# Patient Record
Sex: MALE
[De-identification: names, ages, dates, MRNs, and addresses within clinical notes are randomized per-mention and may not be internally consistent; named-entity substitution may affect disease eponyms.]

---

## 2018-10-09 ENCOUNTER — HOSPITAL ENCOUNTER (EMERGENCY)
Dept: HOSPITAL 97 - ER | Age: 48
Discharge: HOME | End: 2018-10-09
Payer: COMMERCIAL

## 2018-10-09 DIAGNOSIS — L98.0: Primary | ICD-10-CM

## 2018-10-09 DIAGNOSIS — I10: ICD-10-CM

## 2018-10-09 PROCEDURE — 99282 EMERGENCY DEPT VISIT SF MDM: CPT

## 2018-10-09 NOTE — EDPHYS
Physician Documentation                                                                           

 [facility]                                                                

Name: Lucas Riddle                                                                               

Age: 47 yrs                                                                                       

Sex: Male                                                                                         

: 1970                                                                                   

MRN: [id number]                                                                                   

Arrival Date: 10/09/2018                                                                          

Time: 19:20                                                                                       

Account#: [id number]                                                                            

Bed 11                                                                                            

Private MD:                                                                                       

ED Physician Rory Nye                                                                         

HPI:                                                                                              

10/09                                                                                             

20:00 This 47 yrs old  Male presents to ER via Ambulatory with complaints of          snw 

      Laceration - finger.                                                                        

20:00 Onset: The symptoms/episode began/occurred gradually, 4 week(s) ago, and became         snw 

      persistent. The patient has not experienced similar symptoms in the past. The patient       

      has not recently seen a physician. area bleeds easily and annoys patient.                   

                                                                                                  

Historical:                                                                                       

- Allergies:                                                                                      

19:31 No Known Allergies;                                                                     sr5 

- Home Meds:                                                                                      

19:31 antibiotic? [Active];                                                                   sr5 

- PMHx:                                                                                           

19:31 borderline HTN;                                                                         sr5 

                                                                                                  

- Immunization history:: Last tetanus immunization: up to date.                                   

- Social history:: Smoking status: Patient/guardian denies using tobacco, never smoked.           

- Ebola Screening: : Patient negative for fever greater than or equal to 101.5 degrees            

  Fahrenheit, and additional compatible Ebola Virus Disease symptoms.                             

                                                                                                  

                                                                                                  

ROS:                                                                                              

19:59 Constitutional: Negative for fever, chills, and weight loss, Eyes: Negative for injury, snw 

      pain, redness, and discharge, ENT: Negative for injury, pain, and discharge, Neck:          

      Negative for injury, pain, and swelling, Cardiovascular: Negative for chest pain,           

      palpitations, and edema, Respiratory: Negative for shortness of breath, cough,              

      wheezing, and pleuritic chest pain, Abdomen/GI: Negative for abdominal pain, nausea,        

      vomiting, diarrhea, and constipation, Back: Negative for injury and pain, : Negative      

      for injury, bleeding, discharge, and swelling, Skin: Negative for injury, rash, and         

      discoloration, Neuro: Negative for headache, weakness, numbness, tingling, and seizure.     

19:59 MS/extremity: Positive for injury or acute deformity, laceration, swelling, of the          

      palmar aspect of proximal phalanx of right ring finger.                                     

                                                                                                  

Exam:                                                                                             

19:58 Constitutional:  This is a well developed, well nourished patient who is awake, alert,  snw 

      and in no acute distress. Head/Face:  Normocephalic, atraumatic. Eyes:  Pupils equal        

      round and reactive to light, extra-ocular motions intact.  Lids and lashes normal.          

      Conjunctiva and sclera are non-icteric and not injected.  Cornea within normal limits.      

      Periorbital areas with no swelling, redness, or edema. ENT:  Nares patent. No nasal         

      discharge, no septal abnormalities noted.  Tympanic membranes are normal and external       

      auditory canals are clear.  Oropharynx with no redness, swelling, or masses, exudates,      

      or evidence of obstruction, uvula midline.  Mucous membranes moist. Neck:  Trachea          

      midline, no thyromegaly or masses palpated, and no cervical lymphadenopathy.  Supple,       

      full range of motion without nuchal rigidity, or vertebral point tenderness.  No            

      Meningismus. Chest/axilla:  Normal chest wall appearance and motion.  Nontender with no     

      deformity.  No lesions are appreciated. Cardiovascular:  Regular rate and rhythm with a     

      normal S1 and S2.  No gallops, murmurs, or rubs.  Normal PMI, no JVD.  No pulse             

      deficits. Respiratory:  Lungs have equal breath sounds bilaterally, clear to                

      auscultation and percussion.  No rales, rhonchi or wheezes noted.  No increased work of     

      breathing, no retractions or nasal flaring. Abdomen/GI:  Soft, non-tender, with normal      

      bowel sounds.  No distension or tympany.  No guarding or rebound.  No evidence of           

      tenderness throughout. Back:  No spinal tenderness.  No costovertebral tenderness.          

      Full range of motion. MS/ Extremity:  Pulses equal, no cyanosis.  Neurovascular intact.     

       Full, normal range of motion. Neuro:  Awake and alert, GCS 15, oriented to person,         

      place, time, and situation.  Cranial nerves II-XII grossly intact.  Motor strength 5/5      

      in all extremities.  Sensory grossly intact.  Cerebellar exam normal.  Normal gait.         

19:58 Skin: Appearance: normal except for affected area, injury, pyogenic granuloma to coy     

      surface of right ring finger.                                                               

                                                                                                  

Vital Signs:                                                                                      

19:31  / 101; Pulse 77; Resp 18; Temp 97.3; Pulse Ox 100% ; Weight 90.72 kg (R); Height sr5 

      5 ft. 11 in. (180.34 cm); Pain 0/10;                                                        

19:31 Body Mass Index 27.89 (90.72 kg, 180.34 cm)                                             sr5 

                                                                                                  

MDM:                                                                                              

19:49 Patient medically screened.                                                             snw 

20:00 Data reviewed: vital signs, nurses notes. Data interpreted: Pulse oximetry: on room air snw 

      is 100 %. Interpretation: normal. Counseling: I had a detailed discussion with the          

      patient and/or guardian regarding: the historical points, exam findings, and any            

      diagnostic results supporting the discharge/admit diagnosis, the presence of at least       

      one elevated blood pressure reading (>120/80) during this emergency department visit,       

      to return to the emergency department if symptoms worsen or persist or if there are any     

      questions or concerns that arise at home. Special discussion: I have referred the           

      patient to see his PCP for further evaluation of high blood pressure. Based on the          

      history and exam findings, there is no indication for further emergent testing or           

      inpatient evaluation. I discussed with the patient/guardian the need to see the hand        

      specialist for further evaluation of the symptoms. I discussed with the                     

      patient/guardian the need to see the primary care provider for further evaluation of        

      the symptoms.                                                                               

                                                                                                  

Administered Medications:                                                                         

No medications were administered                                                                  

                                                                                                  

                                                                                                  

Disposition:                                                                                      

20:43 Co-signature as Attending Physician, Rory Nye MD.                                    rn  

                                                                                                  

Disposition:                                                                                      

10/09/18 19:58 Discharged to Home. Impression: Pyogenic granuloma - right ring finger.            

- Condition is Stable.                                                                            

- Discharge Instructions: Wound Care.                                                             

- Prescriptions for Bactrim - 160 mg Oral Tablet - take 1 tablet by ORAL route              

  every 12 hours for 5 days; 10 tablet.                                                           

- Medication Reconciliation Form, Thank You Letter, Antibiotic Education, Prescription            

  Opioid Use form.                                                                                

- Work release form (10/09/18 20:30).                                                         snw 

- Follow up: Luis Daniel Lowe; When: 2 - 3 days; Reason: Worsening of condition. Follow             

  up: Leopoldo Lapuerta; When: 2 - 3 days; Reason: Recheck today's complaints,                    

  Continuance of care.                                                                            

                                                                                                  

                                                                                                  

                                                                                                  

Signatures:                                                                                       

Kandace Wall, FNP-C                 FNP-Csnw                                                  

Rory Nye MD MD   rn                                                   

Felicia, Kip RN                       RN   sr5                                                  

Jesus Alcantara RN                    RN   mg2                                                  

                                                                                                  

Corrections: (The following items were deleted from the chart)                                    

20:17 19:58 10/09/2018 19:58 Discharged to Home. Impression: Pyogenic granuloma - right ring  mg2 

      finger. Condition is Stable. Discharge Instructions: Wound Care. Prescriptions for          

      Bactrim -160 mg Oral Tablet - take 1 tablet by ORAL route every 12 hours for 5        

      days; 10 tablet. and Forms are Medication Reconciliation Form, Thank You Letter,            

      Antibiotic Education, Prescription Opioid Use. Follow up: Luis Daniel Lowe; When: 2 - 3       

      days; Reason: Worsening of condition. Follow up: Leopoldo Lapuerta; When: 2 - 3 days;       

      Reason: Recheck today's complaints, Continuance of care. snw                                

                                                                                                  

**************************************************************************************************

## 2018-10-09 NOTE — ER
Nurse's Notes                                                                                     

 [facility]                                                                

Name: Lucas Riddle                                                                               

Age: 47 yrs                                                                                       

Sex: Male                                                                                         

: 1970                                                                                   

MRN: [id number]                                                                                   

Arrival Date: 10/09/2018                                                                          

Time: 19:20                                                                                       

Account#: [id number]                                                                            

Bed 11                                                                                            

Private MD:                                                                                       

Diagnosis: Pyogenic granuloma-right ring finger                                                   

                                                                                                  

Presentation:                                                                                     

10/09                                                                                             

19:29 Presenting complaint: Patient states: wound to RIGHT ring finger sustained approx 5     sr5 

      weeks ago "initially a blood blister, then I pulled the skin, then this started to grow     

      out like this". Raised area noted, bleeding controlled. Currently taking Cephalexin         

      500mg for this wound, tetanus UTD. Transition of care: patient was not received from        

      another setting of care. Complicating Factors: There are no complicating factors for        

      this patient. Onset of symptoms.                                                            

19:29 Method Of Arrival: Ambulatory                                                           sr5 

19:29 Acuity: ARABELLA 3                                                                           sr5 

20:16 Risk Assessment: Do you want to hurt yourself or someone else? Patient reports no       mg2 

      desire to harm self or others. Initial Sepsis Screen: Does the patient meet any 2           

      criteria? No. Patient's initial sepsis screen is negative. Does the patient have a          

      suspected source of infection? No. Patient's initial sepsis screen is negative. Care        

      prior to arrival: None.                                                                     

                                                                                                  

Triage Assessment:                                                                                

19:31 General: Appears in no apparent distress. Behavior is calm, cooperative. Pain:          sr5 

      Complains of pain in palmar aspect of middle phalanx of right ring finger Pain              

      currently is 0 out of 10 on a pain scale. Neuro: No deficits noted. Cardiovascular: No      

      deficits noted. Respiratory: No deficits noted. Injury Description: raised area to          

      RIGHT ring finger.                                                                          

                                                                                                  

Historical:                                                                                       

- Allergies:                                                                                      

19:31 No Known Allergies;                                                                     sr5 

- Home Meds:                                                                                      

19:31 antibiotic? [Active];                                                                   sr5 

- PMHx:                                                                                           

19:31 borderline HTN;                                                                         sr5 

                                                                                                  

- Immunization history:: Last tetanus immunization: up to date.                                   

- Social history:: Smoking status: Patient/guardian denies using tobacco, never smoked.           

- Ebola Screening: : Patient negative for fever greater than or equal to 101.5 degrees            

  Fahrenheit, and additional compatible Ebola Virus Disease symptoms.                             

                                                                                                  

                                                                                                  

Screenin:41 Abuse screen: Denies threats or abuse. Denies injuries from another. Nutritional        mg2 

      screening: No deficits noted. Tuberculosis screening: No symptoms or risk factors           

      identified. Fall Risk None identified.                                                      

                                                                                                  

Assessment:                                                                                       

20:15 General: Appears in no apparent distress. comfortable, Behavior is calm, cooperative.   mg2 

      Pain: Complains of pain in right hand and palmar aspect of middle phalanx of right ring     

      finger. Musculoskeletal: Circulation, motion, and sensation intact. Injury Description:     

      open wound in the hand/ with tissue protrusion.                                             

20:17 Injury Description: Laceration is clean, not bleeding.                                  mg2 

                                                                                                  

Vital Signs:                                                                                      

19:31  / 101; Pulse 77; Resp 18; Temp 97.3; Pulse Ox 100% ; Weight 90.72 kg (R); Height sr5 

      5 ft. 11 in. (180.34 cm); Pain 0/10;                                                        

19:31 Body Mass Index 27.89 (90.72 kg, 180.34 cm)                                             sr5 

                                                                                                  

ED Course:                                                                                        

19:20 Patient arrived in ED.                                                                  am2 

19:31 Triage completed.                                                                       sr5 

19:31 Arm band placed on.                                                                     sr5 

19:40 Jesus Alcantara, RN is Primary Nurse.                                                  mg2 

19:44 [facility]                                                        snw 

19:44 Rory Nye MD is Attending Physician.                                                snw 

19:57 Luis Daniel Lowe MD is Referral Physician.                                              snw 

19:57 Lapuerta, Leopoldo, MD is Referral Physician.                                           snw 

20:15 No provider procedures requiring assistance completed. Patient did not have IV access   mg2 

      during this emergency room visit.                                                           

20:16 Dressings: non-adherent dressing.                                                       mg2 

20:17 Patient has correct armband on for positive identification.                             mg2 

                                                                                                  

Administered Medications:                                                                         

No medications were administered                                                                  

                                                                                                  

                                                                                                  

Outcome:                                                                                          

19:58 Discharge ordered by MD.                                                                snw 

20:17 Discharged to home ambulatory, with family.                                             mg2 

20:17 Condition: stable                                                                           

20:17 Discharge instructions given to patient, family, Instructed on discharge instructions,      

      follow up and referral plans. medication usage, Demonstrated understanding of               

      instructions, follow-up care, medications, Prescriptions given X 1.                         

20:17 Patient left the ED.                                                                    mg2 

                                                                                                  

Signatures:                                                                                       

Kandace Wall FNP-C FNP-Kip Perry RN                       RN   sr5                                                  

Caron Link                               am2                                                  

Jesus Alcantara RN                    RN   mg2                                                  

                                                                                                  

Corrections: (The following items were deleted from the chart)                                    

19:33 19:29 Presenting complaint: Patient states: wound to RIGHT ring finger sustained approx sr5 

      5 weeks ago "initially a blood blister, then I pulled the skin, then this started to        

      grow out like this". Raised area noted, bleeding controlled. Currently taking               

      antibiotics for this wound, tetanus UTD. sr5                                                

                                                                                                  

**************************************************************************************************